# Patient Record
Sex: MALE | Race: WHITE | NOT HISPANIC OR LATINO | Employment: FULL TIME | URBAN - METROPOLITAN AREA
[De-identification: names, ages, dates, MRNs, and addresses within clinical notes are randomized per-mention and may not be internally consistent; named-entity substitution may affect disease eponyms.]

---

## 2023-01-23 ENCOUNTER — TELEPHONE (OUTPATIENT)
Dept: OTOLARYNGOLOGY | Facility: CLINIC | Age: 68
End: 2023-01-23
Payer: MEDICARE

## 2023-01-23 NOTE — TELEPHONE ENCOUNTER
----- Message from Jennifer Massey sent at 1/23/2023  9:13 AM CST -----  Regarding: appt / referral sent: Dr. Ryne Rodriguez  Contact: PT @ [phone]  Caller Claudia with Dr. Ryne Rodriguez's office at [facility] is calling to f/u to see if referral was received from their office for the pt. Pt's dx: hoarseness; referral was sent on January 9th and January 16th. Caller also refaxed the referral this morning. Please call pt to schedule. Thanks.

## 2023-02-02 ENCOUNTER — OFFICE VISIT (OUTPATIENT)
Dept: OTOLARYNGOLOGY | Facility: CLINIC | Age: 68
End: 2023-02-02
Payer: MEDICARE

## 2023-02-02 VITALS — HEART RATE: 67 BPM | DIASTOLIC BLOOD PRESSURE: 84 MMHG | SYSTOLIC BLOOD PRESSURE: 150 MMHG | WEIGHT: 175 LBS

## 2023-02-02 DIAGNOSIS — R49.0 DYSPHONIA: Primary | ICD-10-CM

## 2023-02-02 DIAGNOSIS — J38.7 LEUKOPLAKIA OF LARYNX: ICD-10-CM

## 2023-02-02 DIAGNOSIS — J04.0 LARYNGITIS: ICD-10-CM

## 2023-02-02 PROCEDURE — 31579 LARYNGOSCOPY TELESCOPIC: CPT | Mod: PBBFAC | Performed by: OTOLARYNGOLOGY

## 2023-02-02 PROCEDURE — 99999 PR PBB SHADOW E&M-EST. PATIENT-LVL III: ICD-10-PCS | Mod: PBBFAC,,, | Performed by: OTOLARYNGOLOGY

## 2023-02-02 PROCEDURE — 87076 CULTURE ANAEROBE IDENT EACH: CPT | Performed by: OTOLARYNGOLOGY

## 2023-02-02 PROCEDURE — 87075 CULTR BACTERIA EXCEPT BLOOD: CPT | Performed by: OTOLARYNGOLOGY

## 2023-02-02 PROCEDURE — 31579 LARYNGOSCOPY TELESCOPIC: CPT | Mod: S$PBB,,, | Performed by: OTOLARYNGOLOGY

## 2023-02-02 PROCEDURE — 99213 OFFICE O/P EST LOW 20 MIN: CPT | Mod: PBBFAC | Performed by: OTOLARYNGOLOGY

## 2023-02-02 PROCEDURE — 87186 SC STD MICRODIL/AGAR DIL: CPT | Performed by: OTOLARYNGOLOGY

## 2023-02-02 PROCEDURE — 31579 PR LARYNGOSCOPY, FLEX/RIGID TELESCOPIC, W/STROBOSCOPY: ICD-10-PCS | Mod: S$PBB,,, | Performed by: OTOLARYNGOLOGY

## 2023-02-02 PROCEDURE — 87070 CULTURE OTHR SPECIMN AEROBIC: CPT | Performed by: OTOLARYNGOLOGY

## 2023-02-02 PROCEDURE — 99999 PR PBB SHADOW E&M-EST. PATIENT-LVL III: CPT | Mod: PBBFAC,,, | Performed by: OTOLARYNGOLOGY

## 2023-02-02 PROCEDURE — 99204 OFFICE O/P NEW MOD 45 MIN: CPT | Mod: 25,S$PBB,, | Performed by: OTOLARYNGOLOGY

## 2023-02-02 PROCEDURE — 99204 PR OFFICE/OUTPT VISIT, NEW, LEVL IV, 45-59 MIN: ICD-10-PCS | Mod: 25,S$PBB,, | Performed by: OTOLARYNGOLOGY

## 2023-02-02 PROCEDURE — 87102 FUNGUS ISOLATION CULTURE: CPT | Performed by: OTOLARYNGOLOGY

## 2023-02-02 PROCEDURE — 87077 CULTURE AEROBIC IDENTIFY: CPT | Performed by: OTOLARYNGOLOGY

## 2023-02-02 RX ORDER — OMEPRAZOLE 20 MG/1
TABLET, DELAYED RELEASE ORAL
COMMUNITY

## 2023-02-02 RX ORDER — LISINOPRIL 40 MG/1
20 TABLET ORAL
COMMUNITY
Start: 2023-01-25

## 2023-02-02 RX ORDER — FLUCONAZOLE 100 MG/1
100 TABLET ORAL DAILY
Qty: 22 TABLET | Refills: 0 | Status: SHIPPED | OUTPATIENT
Start: 2023-02-02 | End: 2023-02-13

## 2023-02-02 NOTE — PROGRESS NOTES
[facility]  Department of Otorhinolaryngology and Communication Sciences    Andrew Blum is a 67 y.o. male who [facility] for consultation at the kind request of Dr. Ryne Rodriguez for further management of hoarseness.     He complains of hoarseness. Onset was sudden. Duration is about a year and a half, following an endoscopy. Time course is constant. Symptoms are worsening. Exacerbating factors include voice use. He denies any alleviating factors. Associated symptoms include throat clearing.  He has done a lot of singing over the last few months and thought the problem got a little worse after that.  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  He practices at home.   Difficulty reaching the same high notes as before.  Ongoing hoarseness in speaking voice.  Clears throat frequently.  He is also a , and finds he gets more hoarse if he does a lot of talking. Nearly loses his voice. Denies dysphagia or dyspnea. He had a bad cough from October into the end of the year--completed 2 different courses of abx and steroids.    Voice Handicap Index Total Score  2/2/2023   Voice Handicap Index Total Score 2     Reflux Symptoms Index Total Score 2/2/2023   Reflux Symptoms Index Total Score 18       Past Medical History  HTN  Reflux/ Schatzki ring - omeprazole for 4-5 years     Past Surgical History  Knee surgery  Shoulder surgery  Hernia surgery     Family History  His family history is not on file.    Social History  He     Allergies  He has No Known Allergies.    Medications  He has a current medication list which includes the following prescription(s): lisinopril and omeprazole.    Review of Systems   Constitutional: Negative.  Negative for fever.   HENT:  Positive for postnasal drip and voice change. Negative for sore throat.    Eyes: Negative.  Negative for visual disturbance.   Respiratory:  Positive for cough. Negative for wheezing.    Cardiovascular: Negative.  Negative for  chest pain.   Gastrointestinal:  Negative for nausea.   Endocrine: Negative.    Genitourinary: Negative.    Musculoskeletal:  Positive for back pain and neck pain. Negative for arthralgias.   Skin: Negative.  Negative for rash.   Allergic/Immunologic: Negative.    Neurological: Negative.  Negative for tremors.   Hematological:  Bruises/bleeds easily.   Psychiatric/Behavioral: Negative.  The patient is not nervous/anxious.         Objective:     VS reviewed  Physical Exam  Constitutional: comfortable, well dressed  Psychiatric: appropriate affect  Respiratory: comfortably breathing, symmetric chest rise, no stridor  Voice: faint variable roughness  Cardiovascular: upper extremities non-edematous  Lymphatic: no cervical lymphadenopathy  Neurologic: alert and oriented to time, place, person, and situation; cranial nerves 3-12 grossly intact  Head: normocephalic  Eyes: conjunctivae and sclerae clear  Ears: normal pinnae, normal external auditory canals, tympanic membranes intact  Nose: mucosa pink and noncongested, no masses, no mucopurulence, no polyps  Oral cavity / oropharynx: no mucosal lesions  Neck: soft, full range of motion, laryngotracheal complex palpable with appropriate landmarks, larynx elevates on swallowing  Indirect laryngoscopy: limited due to gag    Procedure  Rigid Laryngeal Videostroboscopy (23993): Laryngeal videostroboscopy is indicated to assess the laryngeal vibratory biomechanics and vocal fold oscillation, which cannot be assessed with a plain light examination. This was carried out with a 70 degree endoscope. After verbal consent was obtained, the patient was positioned and the tongue was gently secured with a gauze sponge. The endoscope was passed transorally and positioned to image the larynx and hypopharynx in detail. The following features were examined: laryngeal and hypopharyngeal masses; vocal fold range and symmetry of motion; laryngeal mucosal edema, erythema, inflammation, and  hydration; salivary pooling; and gross laryngeal sensation. During phonation, the vocal folds were assessed for glottal closure; mucosal wave; vocal fold lesions; vibratory periodicity, amplitude, and phase symmetry; and vertical height match. The equipment was removed. The patient tolerated the procedure well without complication. All findings were normal except:  - leukoplakia along free edge and superior aspect of true vocal folds  - complete closure, pliability intact    Magnified Laryngeal Endoscopy with collection of Laryngeal Culture: Laryngeal culture is indicated to direct antimicrobial therapy. The oropharynx was topically anesthetized with a 1 second spray of Cetacaine.  The 70 degree rigid endoscope was advanced into the oropharynx.  The endolarynx was topically anesthetized with progressive aliquots of 4% lidocaine, with an Jayant cannula via the laryngeal gargle technique. After an adequate degree of anesthesia was obtained, a culture swab was passed transorally to collect a specimen from the surface of the bilateral true vocal folds. The specimen was sent for  aerobic, anaerobic, and fungal culture . The patient tolerated the procedure well without complication.    Assessment:     Andrew Blum is a 67 y.o. male with chronic dysphonia.  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  A culture was obtained at today's visit.       Plan:        I had a discussion with the patient and his wife  regarding his condition and the further workup and management options.      I recommended empiric treatment with Diflucan and sent in a prescription to his pharmacy.    I will follow-up on the culture results and will adjust antimicrobial therapy as appropriate.    He will follow up with me in 2-3 week(s).  Depending on his progress, micro laryngeal surgical evaluation with biopsy and possible laser treatment could be considered.    All questions were answered, and the  patient is in agreement with the above.     Augusto Ruiz M.D.  [facility]  Department of Otorhinolaryngology and Communication Sciences

## 2023-02-05 LAB — BACTERIA SPEC AEROBE CULT: ABNORMAL

## 2023-02-08 LAB — BACTERIA SPEC ANAEROBE CULT: ABNORMAL

## 2023-02-13 ENCOUNTER — TELEPHONE (OUTPATIENT)
Dept: OTOLARYNGOLOGY | Facility: CLINIC | Age: 68
End: 2023-02-13
Payer: MEDICARE

## 2023-02-13 DIAGNOSIS — J04.0 LARYNGITIS: Primary | ICD-10-CM

## 2023-02-13 RX ORDER — AMOXICILLIN AND CLAVULANATE POTASSIUM 875; 125 MG/1; MG/1
1 TABLET, FILM COATED ORAL 2 TIMES DAILY
Qty: 20 TABLET | Refills: 0 | Status: SHIPPED | OUTPATIENT
Start: 2023-02-13 | End: 2023-02-27 | Stop reason: SDUPTHER

## 2023-02-13 NOTE — TELEPHONE ENCOUNTER
----- Message from Augusto Ruiz MD sent at 2/13/2023  8:30 AM CST -----  Please have him stop the Diflucan I Rx'd at his last visit and instead start the Augmentin Rx I just sent into his pharmacy. Thanks.

## 2023-02-27 ENCOUNTER — OFFICE VISIT (OUTPATIENT)
Dept: OTOLARYNGOLOGY | Facility: CLINIC | Age: 68
End: 2023-02-27
Payer: MEDICARE

## 2023-02-27 VITALS — DIASTOLIC BLOOD PRESSURE: 90 MMHG | HEART RATE: 80 BPM | SYSTOLIC BLOOD PRESSURE: 151 MMHG

## 2023-02-27 DIAGNOSIS — J38.7 LEUKOPLAKIA OF LARYNX: ICD-10-CM

## 2023-02-27 DIAGNOSIS — J04.0 LARYNGITIS: ICD-10-CM

## 2023-02-27 DIAGNOSIS — R49.0 DYSPHONIA: Primary | ICD-10-CM

## 2023-02-27 PROCEDURE — 99999 PR PBB SHADOW E&M-EST. PATIENT-LVL III: CPT | Mod: PBBFAC,,, | Performed by: OTOLARYNGOLOGY

## 2023-02-27 PROCEDURE — 31579 LARYNGOSCOPY TELESCOPIC: CPT | Mod: S$PBB,,, | Performed by: OTOLARYNGOLOGY

## 2023-02-27 PROCEDURE — 99213 OFFICE O/P EST LOW 20 MIN: CPT | Mod: PBBFAC,25 | Performed by: OTOLARYNGOLOGY

## 2023-02-27 PROCEDURE — 99213 OFFICE O/P EST LOW 20 MIN: CPT | Mod: 25,S$PBB,, | Performed by: OTOLARYNGOLOGY

## 2023-02-27 PROCEDURE — 99999 PR PBB SHADOW E&M-EST. PATIENT-LVL III: ICD-10-PCS | Mod: PBBFAC,,, | Performed by: OTOLARYNGOLOGY

## 2023-02-27 PROCEDURE — 99213 PR OFFICE/OUTPT VISIT, EST, LEVL III, 20-29 MIN: ICD-10-PCS | Mod: 25,S$PBB,, | Performed by: OTOLARYNGOLOGY

## 2023-02-27 PROCEDURE — 31579 PR LARYNGOSCOPY, FLEX/RIGID TELESCOPIC, W/STROBOSCOPY: ICD-10-PCS | Mod: S$PBB,,, | Performed by: OTOLARYNGOLOGY

## 2023-02-27 PROCEDURE — 31579 LARYNGOSCOPY TELESCOPIC: CPT | Mod: PBBFAC | Performed by: OTOLARYNGOLOGY

## 2023-02-27 RX ORDER — AMOXICILLIN AND CLAVULANATE POTASSIUM 875; 125 MG/1; MG/1
1 TABLET, FILM COATED ORAL 2 TIMES DAILY
Qty: 42 TABLET | Refills: 0 | Status: SHIPPED | OUTPATIENT
Start: 2023-02-27 | End: 2023-04-03 | Stop reason: SDUPTHER

## 2023-02-27 NOTE — PROGRESS NOTES
[facility]  Department of Otorhinolaryngology and Communication Sciences    Subjective:      Andrew Blum is a 67 y.o. male who presents for follow-up regarding leukoplakia/laryngitis.    Fungus - negative (pending)  Aerobic/anaerobic cultures -   SERRATIA LIQUEFACIENS   Moderate   No other significant isolate   Abnormal   PREVOTELLA (B.) MELANINOGENICA   Few     He completed 3-4 days of dilfucan before I switched his therapy to augmentin based on culture results. His voice is much better. It no longer is getting hoarse with use. He still has a little difficulty singing.    The patient's medications, allergies, past medical, surgical, social and family histories were reviewed and updated as appropriate.    A detailed review of systems was obtained with pertinent positives as per the above HPI, and otherwise negative.      Objective:     BP (!) 151/90 (Patient Position: Standing)   Pulse 80    Constitutional: comfortable, well dressed  Psychiatric: appropriate affect  Respiratory: comfortably breathing, symmetric chest rise, no stridor  Voice:  faint variable roughness ; marked interval improvements, particularly in uppermost register  Head: normocephalic  Eyes: conjunctivae and sclerae clear  Indirect laryngoscopy is limited due to gag    Procedure  Rigid Laryngeal Videostroboscopy (64383): Laryngeal videostroboscopy is indicated to assess the laryngeal vibratory biomechanics and vocal fold oscillation, which cannot be assessed with a plain light examination. This was carried out with a 70 degree endoscope. After verbal consent was obtained, the patient was positioned and the tongue was gently secured with a gauze sponge. The endoscope was passed transorally and positioned to image the larynx and hypopharynx in detail. The following features were examined: laryngeal and hypopharyngeal masses; vocal fold range and symmetry of motion; laryngeal mucosal edema, erythema, inflammation, and hydration;  salivary pooling; and gross laryngeal sensation. During phonation, the vocal folds were assessed for glottal closure; mucosal wave; vocal fold lesions; vibratory periodicity, amplitude, and phase symmetry; and vertical height match. The equipment was removed. The patient tolerated the procedure well without complication. All findings were normal except:  - leukoplakia along free edge and superior aspect of true vocal folds; interval decrease in burden since last assessment  - interval recovery of pliability, particularly in uppermost register  - complete closure, pliability intact      Data Reviewed  See HPI      Assessment:     Andrew Blum is a 67 y.o. male with chronic laryngitis, polymicrobial, improving with antibiotic therap[y.     Plan:     Reassurance was provided. I recommended continued antibiotic therapy with 3 weeks more of augmentin.    He will follow up with me in about 3 weeks.    All questions were answered, and the patient is in agreement with the plan.    Augusto Ruiz M.D.  [facility]  Department of Otorhinolaryngology and Communication Sciences

## 2023-03-08 LAB — FUNGUS SPEC CULT: NORMAL

## 2023-04-03 ENCOUNTER — OFFICE VISIT (OUTPATIENT)
Dept: OTOLARYNGOLOGY | Facility: CLINIC | Age: 68
End: 2023-04-03
Payer: MEDICARE

## 2023-04-03 DIAGNOSIS — J04.0 LARYNGITIS: ICD-10-CM

## 2023-04-03 DIAGNOSIS — J38.7 LEUKOPLAKIA OF LARYNX: ICD-10-CM

## 2023-04-03 DIAGNOSIS — R49.0 DYSPHONIA: Primary | ICD-10-CM

## 2023-04-03 PROCEDURE — 99999 PR PBB SHADOW E&M-EST. PATIENT-LVL III: CPT | Mod: PBBFAC,,, | Performed by: OTOLARYNGOLOGY

## 2023-04-03 PROCEDURE — 99213 OFFICE O/P EST LOW 20 MIN: CPT | Mod: 25,S$PBB,, | Performed by: OTOLARYNGOLOGY

## 2023-04-03 PROCEDURE — 99999 PR PBB SHADOW E&M-EST. PATIENT-LVL III: ICD-10-PCS | Mod: PBBFAC,,, | Performed by: OTOLARYNGOLOGY

## 2023-04-03 PROCEDURE — 99213 PR OFFICE/OUTPT VISIT, EST, LEVL III, 20-29 MIN: ICD-10-PCS | Mod: 25,S$PBB,, | Performed by: OTOLARYNGOLOGY

## 2023-04-03 PROCEDURE — 31579 LARYNGOSCOPY TELESCOPIC: CPT | Mod: S$PBB,,, | Performed by: OTOLARYNGOLOGY

## 2023-04-03 PROCEDURE — 99213 OFFICE O/P EST LOW 20 MIN: CPT | Mod: PBBFAC | Performed by: OTOLARYNGOLOGY

## 2023-04-03 PROCEDURE — 31579 PR LARYNGOSCOPY, FLEX/RIGID TELESCOPIC, W/STROBOSCOPY: ICD-10-PCS | Mod: S$PBB,,, | Performed by: OTOLARYNGOLOGY

## 2023-04-03 PROCEDURE — 31579 LARYNGOSCOPY TELESCOPIC: CPT | Mod: PBBFAC | Performed by: OTOLARYNGOLOGY

## 2023-04-03 RX ORDER — AMOXICILLIN AND CLAVULANATE POTASSIUM 875; 125 MG/1; MG/1
1 TABLET, FILM COATED ORAL 2 TIMES DAILY
Qty: 28 TABLET | Refills: 0 | Status: SHIPPED | OUTPATIENT
Start: 2023-04-03 | End: 2023-04-17

## 2023-04-03 NOTE — PROGRESS NOTES
[facility]  Department of Otorhinolaryngology and Communication Sciences    Subjective:      Andrew Blum is a 67 y.o. male who presents for follow-up regarding leukoplakia/laryngitis.    Cultures 2/2/2023  Fungus - negative  Aerobic/anaerobic cultures -   SERRATIA LIQUEFACIENS   Moderate   No other significant isolate   Abnormal   PREVOTELLA (B.) MELANINOGENICA   Few     He is done well since his last visit.  He completed another 3 weeks of Augmentin.  His voice continues to improve.  He senses that his voice is almost back to baseline.  Nevertheless, within the last week or so, he has been struggling a bit with allergy symptoms including cough and throat clearing.  He has been taking an ACE inhibitor for a long time.    The patient's medications, allergies, past medical, surgical, social and family histories were reviewed and updated as appropriate.    A detailed review of systems was obtained with pertinent positives as per the above HPI, and otherwise negative.      Objective:     VS reviewed   Constitutional: comfortable, well dressed  Psychiatric: appropriate affect  Respiratory: comfortably breathing, symmetric chest rise, no stridor  Voice:  faint variable roughness ; interval improvements  Head: normocephalic  Eyes: conjunctivae and sclerae clear  Indirect laryngoscopy is limited due to gag    Procedure  Rigid Laryngeal Videostroboscopy (56802): Laryngeal videostroboscopy is indicated to assess the laryngeal vibratory biomechanics and vocal fold oscillation, which cannot be assessed with a plain light examination. This was carried out with a 70 degree endoscope. After verbal consent was obtained, the patient was positioned and the tongue was gently secured with a gauze sponge. The endoscope was passed transorally and positioned to image the larynx and hypopharynx in detail. The following features were examined: laryngeal and hypopharyngeal masses; vocal fold range and symmetry of motion;  laryngeal mucosal edema, erythema, inflammation, and hydration; salivary pooling; and gross laryngeal sensation. During phonation, the vocal folds were assessed for glottal closure; mucosal wave; vocal fold lesions; vibratory periodicity, amplitude, and phase symmetry; and vertical height match. The equipment was removed. The patient tolerated the procedure well without complication. All findings were normal except:  - leukoplakia along free edge and superior aspect of true vocal folds; continued interval decrease in burden since last assessment  - interval further recovery of pliability, particularly in uppermost register  - complete closure, pliability intact      Data Reviewed  See HPI      Assessment:     Andrew Blum is a 67 y.o. male with chronic laryngitis, polymicrobial, improving with antibiotic therapy.     Plan:     Reassurance was provided. I recommended continued antibiotic therapy with 2 weeks more of augmentin.    Should the leukoplakia persist, we could consider operative intervention to include biopsy and possible laser treatment.    He will follow up with me in about 3 weeks.    All questions were answered, and the patient is in agreement with the plan.    Augusto Ruiz M.D.  [facility]  Department of Otorhinolaryngology and Communication Sciences

## 2023-05-10 ENCOUNTER — OFFICE VISIT (OUTPATIENT)
Dept: OTOLARYNGOLOGY | Facility: CLINIC | Age: 68
End: 2023-05-10
Payer: MEDICARE

## 2023-05-10 VITALS — SYSTOLIC BLOOD PRESSURE: 139 MMHG | HEART RATE: 71 BPM | DIASTOLIC BLOOD PRESSURE: 86 MMHG

## 2023-05-10 DIAGNOSIS — J04.0 LARYNGITIS: ICD-10-CM

## 2023-05-10 DIAGNOSIS — J38.7 LEUKOPLAKIA OF LARYNX: ICD-10-CM

## 2023-05-10 DIAGNOSIS — D38.0 NEOPLASM OF UNCERTAIN BEHAVIOR OF LARYNX: Primary | ICD-10-CM

## 2023-05-10 DIAGNOSIS — R49.0 DYSPHONIA: ICD-10-CM

## 2023-05-10 PROCEDURE — 99999 PR PBB SHADOW E&M-EST. PATIENT-LVL IV: ICD-10-PCS | Mod: PBBFAC,,, | Performed by: OTOLARYNGOLOGY

## 2023-05-10 PROCEDURE — 99214 OFFICE O/P EST MOD 30 MIN: CPT | Mod: PBBFAC | Performed by: OTOLARYNGOLOGY

## 2023-05-10 PROCEDURE — 31579 LARYNGOSCOPY TELESCOPIC: CPT | Mod: PBBFAC | Performed by: OTOLARYNGOLOGY

## 2023-05-10 PROCEDURE — 99999 PR PBB SHADOW E&M-EST. PATIENT-LVL IV: CPT | Mod: PBBFAC,,, | Performed by: OTOLARYNGOLOGY

## 2023-05-10 PROCEDURE — 99214 OFFICE O/P EST MOD 30 MIN: CPT | Mod: 25,S$PBB,, | Performed by: OTOLARYNGOLOGY

## 2023-05-10 PROCEDURE — 99214 PR OFFICE/OUTPT VISIT, EST, LEVL IV, 30-39 MIN: ICD-10-PCS | Mod: 25,S$PBB,, | Performed by: OTOLARYNGOLOGY

## 2023-05-10 PROCEDURE — 31579 LARYNGOSCOPY TELESCOPIC: CPT | Mod: S$PBB,,, | Performed by: OTOLARYNGOLOGY

## 2023-05-10 PROCEDURE — 31579 PR LARYNGOSCOPY, FLEX/RIGID TELESCOPIC, W/STROBOSCOPY: ICD-10-PCS | Mod: S$PBB,,, | Performed by: OTOLARYNGOLOGY

## 2023-05-10 NOTE — H&P (VIEW-ONLY)
[facility]  Department of Otorhinolaryngology and Communication Sciences    Subjective:      Andrew Blum is a 67 y.o. male who presents for follow-up regarding leukoplakia/laryngitis.    Cultures 2/2/2023  Fungus - negative  Aerobic/anaerobic cultures -   SERRATIA LIQUEFACIENS   Moderate   No other significant isolate   Abnormal   PREVOTELLA (B.) MELANINOGENICA   Few     He is done well since his last visit.  He completed another 2 weeks of Augmentin.  His voice continues to improve--especially within the last 2 weeks.    The patient's medications, allergies, past medical, surgical, social and family histories were reviewed and updated as appropriate.    A detailed review of systems was obtained with pertinent positives as per the above HPI, and otherwise negative.      Objective:     VS reviewed   Constitutional: comfortable, well dressed  Psychiatric: appropriate affect  Respiratory: comfortably breathing, symmetric chest rise, no stridor  Voice:  faint variable roughness ; interval improvements  Head: normocephalic  Eyes: conjunctivae and sclerae clear  Indirect laryngoscopy is limited due to gag    Procedure  Rigid Laryngeal Videostroboscopy (84538): Laryngeal videostroboscopy is indicated to assess the laryngeal vibratory biomechanics and vocal fold oscillation, which cannot be assessed with a plain light examination. This was carried out with a 70 degree endoscope. After verbal consent was obtained, the patient was positioned and the tongue was gently secured with a gauze sponge. The endoscope was passed transorally and positioned to image the larynx and hypopharynx in detail. The following features were examined: laryngeal and hypopharyngeal masses; vocal fold range and symmetry of motion; laryngeal mucosal edema, erythema, inflammation, and hydration; salivary pooling; and gross laryngeal sensation. During phonation, the vocal folds were assessed for glottal closure; mucosal wave; vocal fold  lesions; vibratory periodicity, amplitude, and phase symmetry; and vertical height match. The equipment was removed. The patient tolerated the procedure well without complication. All findings were normal except:  - persistent leukoplakia along free edge and superior aspect of true vocal folds; stable since last assessment  - complete closure, pliability intact      Data Reviewed  See HPI      Assessment:     Andrew Blum is a 67 y.o. male with chronic laryngitis, polymicrobial, improved with antibiotic therapy yet with persistent leukoplakia.     Plan:     I recommended that he proceed to the operating room for microlaryngoscopy with micro flap biopsy, possible laser photo ablation--the extent of which is to be dictated by intraoperative findings. I discussed the risks, benefits and alternatives to surgery with the patient, as well as the expected postoperative course. Risks included but were not limited to pain; bleeding; infection; scarring; worsening of voice; recurrence; need for additional and/or more extensive procedures; oral/dental problems (pain, laceration, broken or missing teeth); jaw joint problems (pain, dislocation); and tongue problems (pain, laceration, numbness, weakness, taste disturbance). Any surgery on the larynx also carries with it the risks of airway obstruction necessitating tracheotomy. Also inherent in the procedure are the risks of general anesthesia, including but not limited to cardiovascular complications (heart attack, arrhythmia); pulmonary (respiratory failure); neurologic (stroke); and death.  If the laser was used, it presents risks of vision loss and fire.    I gave the patient the opportunity to ask questions and I answered all of them.  He wishes to proceed.  We will arrange this in the coming weeks, with preoperative testing triage to be completed by the anesthesiology team.    All questions were answered, and the patient is in agreement with the plan.    Augusto NICOLAS  SHARON Ruiz.  [facility]  Department of Otorhinolaryngology and Communication Sciences

## 2023-05-10 NOTE — PROGRESS NOTES
[facility]  Department of Otorhinolaryngology and Communication Sciences    Subjective:      Andrew Blum is a 67 y.o. male who presents for follow-up regarding leukoplakia/laryngitis.    Cultures 2/2/2023  Fungus - negative  Aerobic/anaerobic cultures -   SERRATIA LIQUEFACIENS   Moderate   No other significant isolate   Abnormal   PREVOTELLA (B.) MELANINOGENICA   Few     He is done well since his last visit.  He completed another 2 weeks of Augmentin.  His voice continues to improve--especially within the last 2 weeks.    The patient's medications, allergies, past medical, surgical, social and family histories were reviewed and updated as appropriate.    A detailed review of systems was obtained with pertinent positives as per the above HPI, and otherwise negative.      Objective:     VS reviewed   Constitutional: comfortable, well dressed  Psychiatric: appropriate affect  Respiratory: comfortably breathing, symmetric chest rise, no stridor  Voice:  faint variable roughness ; interval improvements  Head: normocephalic  Eyes: conjunctivae and sclerae clear  Indirect laryngoscopy is limited due to gag    Procedure  Rigid Laryngeal Videostroboscopy (48918): Laryngeal videostroboscopy is indicated to assess the laryngeal vibratory biomechanics and vocal fold oscillation, which cannot be assessed with a plain light examination. This was carried out with a 70 degree endoscope. After verbal consent was obtained, the patient was positioned and the tongue was gently secured with a gauze sponge. The endoscope was passed transorally and positioned to image the larynx and hypopharynx in detail. The following features were examined: laryngeal and hypopharyngeal masses; vocal fold range and symmetry of motion; laryngeal mucosal edema, erythema, inflammation, and hydration; salivary pooling; and gross laryngeal sensation. During phonation, the vocal folds were assessed for glottal closure; mucosal wave; vocal fold  lesions; vibratory periodicity, amplitude, and phase symmetry; and vertical height match. The equipment was removed. The patient tolerated the procedure well without complication. All findings were normal except:  - persistent leukoplakia along free edge and superior aspect of true vocal folds; stable since last assessment  - complete closure, pliability intact      Data Reviewed  See HPI      Assessment:     Andrew Blum is a 67 y.o. male with chronic laryngitis, polymicrobial, improved with antibiotic therapy yet with persistent leukoplakia.     Plan:     I recommended that he proceed to the operating room for microlaryngoscopy with micro flap biopsy, possible laser photo ablation--the extent of which is to be dictated by intraoperative findings. I discussed the risks, benefits and alternatives to surgery with the patient, as well as the expected postoperative course. Risks included but were not limited to pain; bleeding; infection; scarring; worsening of voice; recurrence; need for additional and/or more extensive procedures; oral/dental problems (pain, laceration, broken or missing teeth); jaw joint problems (pain, dislocation); and tongue problems (pain, laceration, numbness, weakness, taste disturbance). Any surgery on the larynx also carries with it the risks of airway obstruction necessitating tracheotomy. Also inherent in the procedure are the risks of general anesthesia, including but not limited to cardiovascular complications (heart attack, arrhythmia); pulmonary (respiratory failure); neurologic (stroke); and death.  If the laser was used, it presents risks of vision loss and fire.    I gave the patient the opportunity to ask questions and I answered all of them.  He wishes to proceed.  We will arrange this in the coming weeks, with preoperative testing triage to be completed by the anesthesiology team.    All questions were answered, and the patient is in agreement with the plan.    Augusto NICOLAS  SHARON Ruiz.  [facility]  Department of Otorhinolaryngology and Communication Sciences

## 2023-05-10 NOTE — PATIENT INSTRUCTIONS
MICROLARYNGOSCOPY    Description  Laryngoscopy is a procedure in which the surgeon closely examines the larynx (voice box). The key instrument for laryngoscopy is the laryngoscope, which is a hollow metal tube inserted into the mouth. Microlaryngoscopy entails--at minimum--a magnified examination of the larynx using a microscope and/or telescopes. This is performed in the operating room. This allows the surgeon to achieve a diagnosis and also to perform precise treatment for problems on the vocal folds (vocal cords) or other parts of the larynx. Additional interventions may be performed in conjunction with microlaryngoscopy. Common interventions include but are not limited to  Biopsy  Removal of abnormal tissue (polyps, cysts, nodules, leukoplakia)  Resection of cancer  Laser treatment of abnormal tissue (papilloma, leukoplakia)  Vocal fold injection augmentation  Injection of medication (steroid, Avastin)    Instructions: before the procedure  NPO after midnight: This is a medical expression for nothing to eat or drink after midnight. It is important to refrain from eating or drinking anything after midnight the night before your surgery.   Please refrain from taking any anti-platelet medications such as aspirin; ibuprofen (Advil, Motrin); Aleve; or Plavix (clopidogrel) for 7 days prior to the procedure.  If you usually take Coumadin (warfarin), you may need to stop using this a few days prior to the injection.   If you are any type of blood thinning (anti-platelet or anti-coagulant) medication and it is not clear what you should do, please clarify this with your surgeon ahead of time. In some cases we rely on other physicians such as cardiologists or hematologists to help with these decisions.  Diabetes precautions: If you are usually take oral diabetes medications (such as metformin), refrain from taking your morning dose the day of the procedure and use sliding-scale insulin for blood sugar  control.  On the morning of your procedure, it is OK to take your other regular morning medications with a small sip of water. It is particularly important to take any medications that treat heart problems (such as blood pressure, heart rate, heart rhythm) and lung problems  (asthma, COPD). Otherwise, please remember: nothing to eat or drink after midnight.      What to expect during the procedure  Microlaryngoscopy is performed in the operating room while you are asleep under general anesthesia. In most instances, you can expect to be under general anesthesia for approximately 1 to 1 ½ hours.  Nevertheless, the duration of the procedure varies depending on the indication for surgery, intraoperative findings, and other patient-specific/anatomic issues. Our primary concerns are your safety and comfort. Our secondary goal is to provide you with the best possible surgical treatment for your problem. Your surgery will take as long as necessary to accomplish these goals.    What to expect afterwards  The laryngoscope is inserted through the mouth and presses on the tongue. The most common postoperative issues patients encounter are related to the laryngoscope being positioned in the mouth. The extent of these issues is related to patient-specific anatomic issues, the indications for surgery, and the duration of the procedure.    Pain. We will do everything we can to make sure you are as comfortable as possible. Most patients experience very little discomfort after this surgery. Nevertheless, you should expect some soreness in the mouth and throat. Because the ear and the throat share the same sensory nerve, you may also experience some discomfort in the ear. The discomfort is usually worst for the first 48-72 hours and usually resolves within a week. You will be prescribed pain medication, but most patients are sufficiently comfortable with plain Tylenol as needed.    Laceration. Some patients may notice a small cut in  the tongue or in another part of the mouth or throat. This may result in a minor about of blood-tinged saliva for the first 24 hours. This usually heals on its own over the course of a week.    Other tongue problems. As the scope presses down on the tongue, the taste buds get compressed. In addition, sometimes the nerves to the tongue also get compressed. As a result, some patients notice a disturbance in taste, numbness of the tongue, or (even more rarely) weakness of the tongue after surgery. Although sometimes it may take several weeks to months for the problem to completely go away, the tongue problems are temporary in almost every instance.    Tooth problems. Reinforced mouth guards are placed on the teeth to protect them during the surgery and we give a great deal of care and attention to minimizing any pressure on the teeth. However, a chipped or cracked tooth, loss of a tooth, and/or other tooth irregularities are rare but well-recognized complications of laryngoscopy.    Jaw problems. You may experience some pain in the jaw joints (in front of the ears). Even less frequent would be dislocation of the joint. This usually occurs in patients who already have jaw problems.    Instructions: after the procedure  Please take the prescribed pain medication as directed. If you are still having discomfort after the prescription runs out, or if you would rather not take a prescription narcotic pain medicine, you may instead take plain acetaminophen (Tylenol) as directed on the packaging.  Voice rest. In many instances, we will recommend COMPLETE VOICE REST until your follow-up visit with your surgeon. This means COMPLETE SILENCE - NO TALKING, NO COUGHING, NO WHISPERING. Please see additional information on how to manage complete voice rest. Even if voice rest is not prescribed, for the first week, you should avoid speaking over heavy background noise or in a very loud voice.   Please call our office at [phone]  if  You have a temperature above 101°F  You develop Increasing pain not relieved by medications  You have any other questions or concerns  Please go immediately to the nearest emergency room if you are experiencing  Shortness of breath  Difficulty breathing  Severe bleeding        VOICE REST FOLLOWING SURGERY     After having laryngeal (voice box) surgery, your voice needs complete rest in order to heal. At your follow-up appointment, which will be 4-6 days later, we will look at your larynx and see how it is healing. Then we will discuss a modified voice rest plan to gradually increase your voice use. This schedule is a basic guideline; specific time periods for complete and modified voice rest will be tailored to you.     OVERALL GUIDELINES FOR VOICE REST   Avoid coughing or throat clearing. If you feel the need to clear your throat, take a sip of water and swallow hard.   Avoid strenuous exercise or activity. Any noise or straining activity to your vocal cords during this time can be damaging and affect how your vocal folds heal.   Remain hydrated. Drink 8-10 glasses of water per day. Avoid caffeine, alcohol, and mentholated cough drops.   Breathe steam several times per day, either from your shower, sink, or a personal humidifier.   If you have reflux, follow diet precautions and take medicine as prescribed.   Avoid first- and second-hand smoke.     IMMEDIATELY AFTER SURGERY:   Week 1 following surgery  Complete voice rest  AFTER FOLLOW-UP APPOINTMENT:   Week 2 following surgery  Modified voice rest      Avoid talking, whispering, throat clearing, coughing, singing, humming, laughing, whistling, or playing musical instruments.   Use pen and paper to write message, or try an jessie on your smart phone/tablet. -- Biotect[employer]one apps: [employer], [employer] Text to Speech.  Android apps: Text to Speech toy.  Arrange for appropriate  support.   Change your outgoing voicemail message to redirect callers to email or  text. Days 8-9: 5-10 minutes of voicing per hour, or voice use for business of life only   Days 10-11: 10-15 minutes of voicing per hour, or voice use for short sentences   Days 12-13: 15-20 minutes of voicing per hour, or voice use for short paragraphs   Days 14-15: 20-30 minutes of voicing per hour   Days 16-17: Ramp up voice use to 80% of normal use. Be sure to take 10-minute voice naps each hour.        Keep in mind that this is a personalized progression. If you have any trouble, back up and do not progress until you are ready. Do not keep talking if your voice wears out or feels tired.

## 2023-05-11 RX ORDER — DEXAMETHASONE SODIUM PHOSPHATE 4 MG/ML
8 INJECTION, SOLUTION INTRA-ARTICULAR; INTRALESIONAL; INTRAMUSCULAR; INTRAVENOUS; SOFT TISSUE
Status: CANCELLED | OUTPATIENT
Start: 2023-05-11

## 2023-05-11 RX ORDER — LIDOCAINE HYDROCHLORIDE 10 MG/ML
1 INJECTION, SOLUTION EPIDURAL; INFILTRATION; INTRACAUDAL; PERINEURAL ONCE
Status: CANCELLED | OUTPATIENT
Start: 2023-05-11 | End: 2023-05-11

## 2023-05-12 ENCOUNTER — PATIENT MESSAGE (OUTPATIENT)
Dept: OTOLARYNGOLOGY | Facility: CLINIC | Age: 68
End: 2023-05-12
Payer: MEDICARE

## 2023-06-06 ENCOUNTER — ANESTHESIA EVENT (OUTPATIENT)
Dept: SURGERY | Facility: HOSPITAL | Age: 68
End: 2023-06-06
Payer: MEDICARE

## 2023-06-06 NOTE — ANESTHESIA PREPROCEDURE EVALUATION
06/06/2023  Andrew Blum is a 67 y.o., male.      Pre-op Assessment    I have reviewed the Patient Summary Reports.       I have reviewed the Medications.     Review of Systems  Anesthesia Hx:  No problems with previous Anesthesia  Denies Family Hx of Anesthesia complications.   Denies Personal Hx of Anesthesia complications.   EENT/Dental:   Vocal cord lesion   Cardiovascular:   Exercise tolerance: good Hypertension, well controlled NYHA Classification II    Hepatic/GI:   Hiatal Hernia, GERD, well controlled    Musculoskeletal:   Arthritis   Spine Disorders: lumbar Degenerative disease and Chronic Pain      Swelling of right knee joint 02/04/2022     Right knee pain 02/04/2022     Low back pain 01/14/2022     Posture abnormality 01/14/2022     Decreased range of motion of lumbar spine 01/14/2022     Esophageal stricture 02/08/2021     Chronic superficial gastritis without bleeding 01/18/2021     Diverticulosis of colon without hemorrhage 01/18/2021     Duodenitis 01/18/2021     Hiatal hernia 01/18/2021     Personal history of colonic polyps 01/08/2021     The visualized lower lungs are grossly clear aside from a calcified   granuloma in the lingula.   The visualized heart size is within normal limits.   There is no pericardial fluid.   A few calcified left hilar lymph nodes are noted, most compatible with   old granulomatous disease.   No significant lower mediastinal mass is detected.     The Smartscore calculation is 213.   The left main coronary artery score is 0.   The LAD score is 124.   The circumflex score is 0.   The right coronary artery score is 89.   The PDA score is 0.    Surgical History    Surgical History  Surgery Date Site/Laterality Comments   SHOULDER SURGERY          HERNIA REPAIR            Physical Exam  General: Well nourished, Cooperative, Alert and Oriented    Airway:  Mallampati:  II   Mouth Opening: Normal  TM Distance: Normal  Tongue: Normal  Neck ROM: Normal ROM    Dental:  Intact        Anesthesia Plan  Type of Anesthesia, risks & benefits discussed:    Anesthesia Type: Gen ETT  Intra-op Monitoring Plan: Standard ASA Monitors  Post Op Pain Control Plan: multimodal analgesia and IV/PO Opioids PRN  Induction:  IV  Informed Consent: Informed consent signed with the Patient and all parties understand the risks and agree with anesthesia plan.  All questions answered.   ASA Score: 2  Day of Surgery Review of History & Physical: H&P Update referred to the surgeon/provider.I have interviewed and examined the patient. I have reviewed the patient's H&P dated: There are no significant changes. H&P completed by Anesthesiologist.  Anesthesia Plan Notes: RSI    Ready For Surgery From Anesthesia Perspective.     .

## 2023-06-08 ENCOUNTER — HOSPITAL ENCOUNTER (OUTPATIENT)
Facility: HOSPITAL | Age: 68
Discharge: HOME | End: 2023-06-08
Attending: OTOLARYNGOLOGY | Admitting: OTOLARYNGOLOGY
Payer: MEDICARE

## 2023-06-08 ENCOUNTER — ANESTHESIA (OUTPATIENT)
Dept: SURGERY | Facility: HOSPITAL | Age: 68
End: 2023-06-08
Payer: MEDICARE

## 2023-06-08 VITALS
OXYGEN SATURATION: 94 % | HEART RATE: 82 BPM | BODY MASS INDEX: 25.11 KG/M2 | TEMPERATURE: 98 F | DIASTOLIC BLOOD PRESSURE: 81 MMHG | RESPIRATION RATE: 16 BRPM | HEIGHT: 70 IN | SYSTOLIC BLOOD PRESSURE: 143 MMHG

## 2023-06-08 DIAGNOSIS — D38.0 NEOPLASM OF UNCERTAIN BEHAVIOR OF LARYNX: Primary | ICD-10-CM

## 2023-06-08 PROCEDURE — 25000003 PHARM REV CODE 250: Performed by: NURSE ANESTHETIST, CERTIFIED REGISTERED

## 2023-06-08 PROCEDURE — 71000015 HC POSTOP RECOV 1ST HR: Performed by: OTOLARYNGOLOGY

## 2023-06-08 PROCEDURE — 63600175 PHARM REV CODE 636 W HCPCS: Performed by: OTOLARYNGOLOGY

## 2023-06-08 PROCEDURE — 25000003 PHARM REV CODE 250: Performed by: ANESTHESIOLOGY

## 2023-06-08 PROCEDURE — 94761 N-INVAS EAR/PLS OXIMETRY MLT: CPT

## 2023-06-08 PROCEDURE — 31541 LARYNSCOP W/TUMR EXC + SCOPE: CPT | Mod: ,,, | Performed by: OTOLARYNGOLOGY

## 2023-06-08 PROCEDURE — 37000009 HC ANESTHESIA EA ADD 15 MINS: Performed by: OTOLARYNGOLOGY

## 2023-06-08 PROCEDURE — 31571 LARYNGOSCOP W/VC INJ + SCOPE: CPT | Mod: 51,,, | Performed by: OTOLARYNGOLOGY

## 2023-06-08 PROCEDURE — 36000708 HC OR TIME LEV III 1ST 15 MIN: Performed by: OTOLARYNGOLOGY

## 2023-06-08 PROCEDURE — 36000709 HC OR TIME LEV III EA ADD 15 MIN: Performed by: OTOLARYNGOLOGY

## 2023-06-08 PROCEDURE — 88305 TISSUE EXAM BY PATHOLOGIST: CPT | Mod: 59 | Performed by: PATHOLOGY

## 2023-06-08 PROCEDURE — 71000033 HC RECOVERY, INTIAL HOUR: Performed by: OTOLARYNGOLOGY

## 2023-06-08 PROCEDURE — 27201423 OPTIME MED/SURG SUP & DEVICES STERILE SUPPLY: Performed by: OTOLARYNGOLOGY

## 2023-06-08 PROCEDURE — 88305 TISSUE EXAM BY PATHOLOGIST: CPT | Mod: 26,,, | Performed by: PATHOLOGY

## 2023-06-08 PROCEDURE — D9220A PRA ANESTHESIA: Mod: ,,, | Performed by: NURSE ANESTHETIST, CERTIFIED REGISTERED

## 2023-06-08 PROCEDURE — 99900035 HC TECH TIME PER 15 MIN (STAT)

## 2023-06-08 PROCEDURE — 31541 PR LARYNGOSCOPY,DIRCT,OP SCOP,EXC TUMR: ICD-10-PCS | Mod: ,,, | Performed by: OTOLARYNGOLOGY

## 2023-06-08 PROCEDURE — 37000008 HC ANESTHESIA 1ST 15 MINUTES: Performed by: OTOLARYNGOLOGY

## 2023-06-08 PROCEDURE — D9220A PRA ANESTHESIA: ICD-10-PCS | Mod: ,,, | Performed by: NURSE ANESTHETIST, CERTIFIED REGISTERED

## 2023-06-08 PROCEDURE — 63600175 PHARM REV CODE 636 W HCPCS: Performed by: ANESTHESIOLOGY

## 2023-06-08 PROCEDURE — 25000003 PHARM REV CODE 250: Performed by: OTOLARYNGOLOGY

## 2023-06-08 PROCEDURE — 31571 PR LARYNGOSCOPY,DIRECT,SCOPE,INJ CORDS: ICD-10-PCS | Mod: 51,,, | Performed by: OTOLARYNGOLOGY

## 2023-06-08 PROCEDURE — 63600175 PHARM REV CODE 636 W HCPCS: Performed by: NURSE ANESTHETIST, CERTIFIED REGISTERED

## 2023-06-08 PROCEDURE — 88305 TISSUE EXAM BY PATHOLOGIST: ICD-10-PCS | Mod: 26,,, | Performed by: PATHOLOGY

## 2023-06-08 RX ORDER — HYDROCODONE BITARTRATE AND ACETAMINOPHEN 5; 325 MG/1; MG/1
1 TABLET ORAL ONCE AS NEEDED
Status: ACTIVE | OUTPATIENT
Start: 2023-06-08

## 2023-06-08 RX ORDER — PHENYLEPHRINE HYDROCHLORIDE 10 MG/ML
INJECTION INTRAVENOUS
Status: DISCONTINUED | OUTPATIENT
Start: 2023-06-08 | End: 2023-06-08

## 2023-06-08 RX ORDER — ROCURONIUM BROMIDE 10 MG/ML
INJECTION, SOLUTION INTRAVENOUS
Status: DISCONTINUED | OUTPATIENT
Start: 2023-06-08 | End: 2023-06-08

## 2023-06-08 RX ORDER — HYDROCODONE BITARTRATE AND ACETAMINOPHEN 5; 325 MG/1; MG/1
1 TABLET ORAL EVERY 6 HOURS PRN
Qty: 10 TABLET | Refills: 0 | Status: SHIPPED | OUTPATIENT
Start: 2023-06-08 | End: 2023-07-26

## 2023-06-08 RX ORDER — FAMOTIDINE 10 MG/ML
20 INJECTION INTRAVENOUS
Status: COMPLETED | OUTPATIENT
Start: 2023-06-08 | End: 2023-06-08

## 2023-06-08 RX ORDER — MIDAZOLAM HYDROCHLORIDE 1 MG/ML
INJECTION, SOLUTION INTRAMUSCULAR; INTRAVENOUS
Status: DISCONTINUED | OUTPATIENT
Start: 2023-06-08 | End: 2023-06-08

## 2023-06-08 RX ORDER — METOCLOPRAMIDE HYDROCHLORIDE 5 MG/ML
10 INJECTION INTRAMUSCULAR; INTRAVENOUS
Status: COMPLETED | OUTPATIENT
Start: 2023-06-08 | End: 2023-06-08

## 2023-06-08 RX ORDER — LIDOCAINE HYDROCHLORIDE 10 MG/ML
1 INJECTION, SOLUTION EPIDURAL; INFILTRATION; INTRACAUDAL; PERINEURAL ONCE
Status: DISCONTINUED | OUTPATIENT
Start: 2023-06-08 | End: 2023-06-08 | Stop reason: HOSPADM

## 2023-06-08 RX ORDER — FAMOTIDINE 10 MG/ML
INJECTION INTRAVENOUS
Status: DISCONTINUED | OUTPATIENT
Start: 2023-06-08 | End: 2023-06-08

## 2023-06-08 RX ORDER — ESMOLOL HYDROCHLORIDE 10 MG/ML
INJECTION INTRAVENOUS
Status: DISCONTINUED | OUTPATIENT
Start: 2023-06-08 | End: 2023-06-08

## 2023-06-08 RX ORDER — LIDOCAINE HYDROCHLORIDE 40 MG/ML
INJECTION, SOLUTION RETROBULBAR
Status: DISCONTINUED | OUTPATIENT
Start: 2023-06-08 | End: 2023-06-08 | Stop reason: HOSPADM

## 2023-06-08 RX ORDER — ACETAMINOPHEN 500 MG
1000 TABLET ORAL
Status: COMPLETED | OUTPATIENT
Start: 2023-06-08 | End: 2023-06-08

## 2023-06-08 RX ORDER — KETOROLAC TROMETHAMINE 30 MG/ML
15 INJECTION, SOLUTION INTRAMUSCULAR; INTRAVENOUS EVERY 30 MIN PRN
Status: ACTIVE | OUTPATIENT
Start: 2023-06-08 | End: 2023-06-08

## 2023-06-08 RX ORDER — ONDANSETRON 2 MG/ML
INJECTION INTRAMUSCULAR; INTRAVENOUS
Status: DISCONTINUED | OUTPATIENT
Start: 2023-06-08 | End: 2023-06-08

## 2023-06-08 RX ORDER — PROPOFOL 10 MG/ML
VIAL (ML) INTRAVENOUS
Status: DISCONTINUED | OUTPATIENT
Start: 2023-06-08 | End: 2023-06-08

## 2023-06-08 RX ORDER — SODIUM CITRATE AND CITRIC ACID MONOHYDRATE 334; 500 MG/5ML; MG/5ML
30 SOLUTION ORAL ONCE
Status: COMPLETED | OUTPATIENT
Start: 2023-06-08 | End: 2023-06-08

## 2023-06-08 RX ORDER — EPINEPHRINE 1 MG/ML
INJECTION, SOLUTION, CONCENTRATE INTRAVENOUS
Status: DISCONTINUED | OUTPATIENT
Start: 2023-06-08 | End: 2023-06-08 | Stop reason: HOSPADM

## 2023-06-08 RX ORDER — GABAPENTIN 300 MG/1
300 CAPSULE ORAL
Status: COMPLETED | OUTPATIENT
Start: 2023-06-08 | End: 2023-06-08

## 2023-06-08 RX ORDER — ONDANSETRON 4 MG/1
4 TABLET, ORALLY DISINTEGRATING ORAL ONCE
Qty: 1 TABLET | Refills: 0 | Status: SHIPPED | OUTPATIENT
Start: 2023-06-08 | End: 2023-06-09

## 2023-06-08 RX ORDER — ONDANSETRON 2 MG/ML
4 INJECTION INTRAMUSCULAR; INTRAVENOUS ONCE AS NEEDED
Status: ACTIVE | OUTPATIENT
Start: 2023-06-08 | End: 2034-11-04

## 2023-06-08 RX ORDER — FENTANYL CITRATE 50 UG/ML
INJECTION, SOLUTION INTRAMUSCULAR; INTRAVENOUS
Status: DISCONTINUED | OUTPATIENT
Start: 2023-06-08 | End: 2023-06-08

## 2023-06-08 RX ORDER — DEXAMETHASONE SODIUM PHOSPHATE 4 MG/ML
INJECTION, SOLUTION INTRA-ARTICULAR; INTRALESIONAL; INTRAMUSCULAR; INTRAVENOUS; SOFT TISSUE
Status: DISCONTINUED | OUTPATIENT
Start: 2023-06-08 | End: 2023-06-08

## 2023-06-08 RX ADMIN — FAMOTIDINE 20 MG: 10 INJECTION, SOLUTION INTRAVENOUS at 10:06

## 2023-06-08 RX ADMIN — FAMOTIDINE 20 MG: 10 INJECTION, SOLUTION INTRAVENOUS at 08:06

## 2023-06-08 RX ADMIN — DEXAMETHASONE SODIUM PHOSPHATE 8 MG: 4 INJECTION, SOLUTION INTRAMUSCULAR; INTRAVENOUS at 10:06

## 2023-06-08 RX ADMIN — PHENYLEPHRINE HYDROCHLORIDE 100 MCG: 10 INJECTION INTRAVENOUS at 11:06

## 2023-06-08 RX ADMIN — SODIUM CHLORIDE: 9 INJECTION, SOLUTION INTRAVENOUS at 10:06

## 2023-06-08 RX ADMIN — FENTANYL CITRATE 100 MCG: 50 INJECTION, SOLUTION INTRAMUSCULAR; INTRAVENOUS at 10:06

## 2023-06-08 RX ADMIN — METOCLOPRAMIDE 10 MG: 5 INJECTION, SOLUTION INTRAMUSCULAR; INTRAVENOUS at 08:06

## 2023-06-08 RX ADMIN — ACETAMINOPHEN 1000 MG: 500 TABLET ORAL at 08:06

## 2023-06-08 RX ADMIN — SODIUM CHLORIDE: 9 INJECTION, SOLUTION INTRAVENOUS at 11:06

## 2023-06-08 RX ADMIN — PROPOFOL 200 MG: 10 INJECTION, EMULSION INTRAVENOUS at 10:06

## 2023-06-08 RX ADMIN — ONDANSETRON 4 MG: 2 INJECTION INTRAMUSCULAR; INTRAVENOUS at 10:06

## 2023-06-08 RX ADMIN — GABAPENTIN 300 MG: 300 CAPSULE ORAL at 08:06

## 2023-06-08 RX ADMIN — ROCURONIUM BROMIDE 50 MG: 10 INJECTION INTRAVENOUS at 10:06

## 2023-06-08 RX ADMIN — MIDAZOLAM HYDROCHLORIDE 2 MG: 1 INJECTION, SOLUTION INTRAMUSCULAR; INTRAVENOUS at 10:06

## 2023-06-08 RX ADMIN — ESMOLOL HYDROCHLORIDE 20 MG: 100 INJECTION, SOLUTION INTRAVENOUS at 10:06

## 2023-06-08 RX ADMIN — SODIUM CITRATE AND CITRIC ACID MONOHYDRATE 30 ML: 500; 334 SOLUTION ORAL at 08:06

## 2023-06-08 NOTE — ANESTHESIA PROCEDURE NOTES
Intubation    Date/Time: 6/8/2023 10:38 AM  Performed by: Miguel Nam CRNA  Authorized by: Reyna Hitchcock MD     Intubation:     Induction:  Intravenous    Intubated:  Postinduction    Mask Ventilation:  Easy with oral airway    Attempts:  1    Attempted By:  CRNA    Method of Intubation:  Video laryngoscopy    Blade:  Amanda 3    Laryngeal View Grade: Grade IIA - cords partially seen      Difficult Airway Encountered?: No      Complications:  None    Airway Device:  Laser tube    Airway Device Size:  6.0    Style/Cuff Inflation:  Cuffed    Tube secured:  21    Secured at:  The lips    Placement Verified By:  Capnometry    Complicating Factors:  None    Findings Post-Intubation:  BS equal bilateral

## 2023-06-08 NOTE — BRIEF OP NOTE
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]    Procedure(s) (LRB):  Suspension microlaryngoscopy with KTP laser excision of lesion (N/A)    Anesthesia: General    Operative Findings: see full op note    Estimated Blood Loss: * No values recorded between 6/8/2023 10:40 AM and 6/8/2023 11:49 AM *         Specimens:   Specimen (24h ago, onward)       Start     Ordered    06/08/23 1108  Specimen to Pathology, Surgery ENT  Once        Comments: Pre-op Diagnosis: 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 CLAUDETTE RUIZ        06/08/23 1137                      Discharge Note    OUTCOME: Patient tolerated treatment/procedure well without complication and is now ready for discharge.    DISPOSITION: Home or Self Care    FINAL DIAGNOSIS:  leukoplakia    FOLLOWUP: In clinic    DISCHARGE INSTRUCTIONS:    Discharge Procedure Orders   Diet Adult Regular     No dressing needed     Activity as tolerated   Order Comments: Total voice rest for 3 days. Avoid speaking, singing, whispering. Try to limit coughing and throat clearing.

## 2023-06-08 NOTE — TRANSFER OF CARE
"Anesthesia Transfer of Care Note    Patient: Andrew Blum    Procedure(s) Performed: Procedure(s) (LRB):  Suspension microlaryngoscopy with KTP laser excision of lesion (N/A)    Patient location: PACU    Transport from OR: Transported from OR on 6-10 L/min O2 by face mask with adequate spontaneous ventilation    Post pain: adequate analgesia    Post assessment: no apparent anesthetic complications    Post vital signs: stable    Level of consciousness: sedated    Nausea/Vomiting: no nausea/vomiting    Complications: none    Transfer of care protocol was followed      Last vitals:   Visit Vitals  BP (!) 178/91 (BP Location: Right arm, Patient Position: Lying)   Pulse 76   Temp 37.2 °C (99 °F) (Temporal)   Resp 17   Ht 5' 10" (1.778 m)   SpO2 97%   BMI 25.11 kg/m²     "

## 2023-06-08 NOTE — DISCHARGE INSTRUCTIONS
VOICE REST FOLLOWING SURGERY     After having laryngeal (voice box) surgery, your voice needs complete rest in order to heal. At your follow-up appointment, which will be 4-6 days later, we will look at your larynx and see how it is healing. Then we will discuss a modified voice rest plan to gradually increase your voice use. This schedule is a basic guideline; specific time periods for complete and modified voice rest will be tailored to you.     OVERALL GUIDELINES FOR VOICE REST   Avoid coughing or throat clearing. If you feel the need to clear your throat, take a sip of water and swallow hard.   Avoid strenuous exercise or activity. Any noise or straining activity to your vocal cords during this time can be damaging and affect how your vocal folds heal.   Remain hydrated. Drink 8-10 glasses of water per day. Avoid caffeine, alcohol, and mentholated cough drops.   Breathe steam several times per day, either from your shower, sink, or a personal humidifier.   If you have reflux, follow diet precautions and take medicine as prescribed.   Avoid first- and second-hand smoke.     POST-OP VOICE SURGERY:   Week 1 following surgery  Complete voice rest  AFTER FOLLOW-UP APPOINTMENT:   Week 2 following surgery  Modified voice rest      Avoid talking, whispering, throat clearing, coughing, singing, humming, laughing, whistling, or playing musical instruments.   Use pen and paper to write message, or try an jessie on your smart phone/tablet. -- [employer]OV[employer]one apps: [employer], [employer] Text to Speech.  Android apps: Text to Speech toy.  Arrange for appropriate  support.   Change your outgoing voicemail message to redirect callers to email or text. Days 8-9: 5-10 minutes of voicing per hour, or voice use for business of life only   Days 10-11: 10-15 minutes of voicing per hour, or voice use for short sentences   Days 12-13: 15-20 minutes of voicing per hour, or voice use for short paragraphs   Days 14-15: 20-30 minutes  of voicing per hour   Days 16-17: Ramp up voice use to 80% of normal use. Be sure to take 10-minute voice naps each hour.        Keep in mind that this is a personalized progression. If you have any trouble, back up and do not progress until you are ready. Do not keep talking if your voice wears out or feels tired.

## 2023-06-08 NOTE — OP NOTE
DATE OF SERVICE:  06/08/2023    PRE-OPERATIVE DIAGNOSIS:  Bilateral vocal fold leukoplakia    POST-OPERATIVE DIAGNOSIS:   Bilateral vocal fold leukoplakia.    Anterior glottic micro web    PROCEDURE(S):  Suspension microlaryngoscopy with KTP laser photoablation of bilateral vocal fold lesions    SURGEON: Augusto Ruiz M.D.    ASSISTANT:  Ricardo Multani MD    ANESTHESIA: General.    BLOOD LOSS: Less than 5 mL.    SPECIMENS:  Right vocal fold lesion    COMPLICATIONS: None.    FINDINGS:   Sessile leukoplakia along the middle 3rd of bilateral true vocal folds, superior aspect, free edge, infraglottic aspect.  Elevation on subepithelial saline infusion.  Not concerning for malignancy or invasion.    CONDITION: Stable.    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  He presents today for micro laryngeal surgery. I discussed the risks, benefits and alternatives to surgery with the patient, as well as the expected postoperative course. Risks included but were not limited to pain; bleeding; infection; scarring; worsening of voice; recurrence; need for additional and/or more extensive procedures; oral/dental problems (pain, laceration, broken or missing teeth); jaw joint problems (pain, dislocation); and tongue problems (pain, laceration, numbness, weakness, taste disturbance). Any surgery on the larynx also carries with it the risks of airway obstruction necessitating tracheotomy. Also inherent in the procedure are the risks of general anesthesia, including but not limited to cardiovascular complications (heart attack, arrhythmia); pulmonary (respiratory failure); neurologic (stroke); and death. If the laser is used, it presents the risks of vision loss and fire. I gave the patient the opportunity to ask questions and I answered all of them. He wishes to proceed with surgery. Informed consent was obtained.    PROCEDURE IN DETAIL:    The patient was positively identified in the preoperative holding area, then was brought to the operating room and placed in the flat supine position. General endotracheal anesthesia was obtained using a 5 Zeietels endotracheal tube which was secured to the left lower lip. The eyes were protected with moist sponges. The teeth were protected using a reinforced mouthguard. A final timeout was performed for verification purposes. The 5 Zeitels laryngoscope was inserted into the oral cavity and was utilized to expose the larynx. The patient was suspended from the gallows. Magnified laryngeal endoscopy was carried out using a 0 degree Moise sary telescope connected to a video monitor. Findings were as noted above. Photodocumentation was obtained.  Next, attention was turned to performing surgical intervention as indicated. The operating microscope was brought into the field, and the remainder of the case was performed under maximal magnification.  A subepithelial saline infusion was performed in the right true vocal fold.  The disordered epithelium elevated, consistent with an intraepithelial process.  Attention was turned to micro flap biopsy of the lesion.  A laryngeal sickle blade was used to make a linear epithelial cordotomy at the junction of the right vocal fold lesion with the adjacent epithelium.  A medial micro flap was developed, with care maintained to preserve the integrity of the under larynx lamina propria.  An anterior and posterior epithelial cordotomy for made through the flap, which was then rotated medially and inferiorly setting up for an inferior epithelial cordotomy.  The specimen, once freed, was passed off for permanent analysis.  Next, attention was turned to KTP laser photo ablation. All operating room personnel were equipped with laser safe eyewear. The FiO2 was maintained at less than 30%. All exposed regions of the patient's skin and face were protected with moist towels.  The 0.4 mm KTP  laser fiber was loaded onto its straight handpiece.  It was set at 30 w, 15 millisecond pulses, 2 pps.  The laser was used in noncontact mode to perform photo ablation of all residual leukoplakia along the right true vocal fold.  Care was maintained to refrain from transferring any energy to the underlying lamina propria.  The treated tissue, once addressed with the laser cotton was carefully debrided with micro laryngeal suction.  The leukoplakia along the left true vocal fold was thereafter KTP laser photo ablated in a similar fashion.  With this, the procedure was brought to completion. The larynx was topically anesthetized with 3 mL of 4% lidocaine. All equipment was removed. The patient was turned back over to the anesthesiology team for awakening and extubation, which were uneventful. The patient was escorted to the recovery room in good condition. The patient tolerated the procedure well without complications. All needle, sponge, and instrument counts were correct at the completion of the case.    ATTESTATION:  As the attending of record, I was present and participated in all portions of this procedure.

## 2023-06-09 ENCOUNTER — TELEPHONE (OUTPATIENT)
Dept: OTOLARYNGOLOGY | Facility: CLINIC | Age: 68
End: 2023-06-09
Payer: MEDICARE

## 2023-06-09 ENCOUNTER — PATIENT MESSAGE (OUTPATIENT)
Dept: OTOLARYNGOLOGY | Facility: CLINIC | Age: 68
End: 2023-06-09
Payer: MEDICARE

## 2023-06-09 NOTE — TELEPHONE ENCOUNTER
----- Message from Di Murphy sent at 6/9/2023  1:15 PM CDT -----  Regarding: Missed Call  Contact: [phone]  Pts spouse is requesting a call back stating there was a missed call from you.  She is requesting to be called at [phone] because pt can't speak.  If you call pts phone, he can't answer, only text.  Please call.

## 2023-06-09 NOTE — TELEPHONE ENCOUNTER
----- Message from Terra Roa sent at 6/9/2023  8:22 AM CDT -----  Regarding: pt advice  Contact: [phone]  Pt wife Divya reina in regards to needing to speak to provider. Pls call

## 2023-06-12 NOTE — ANESTHESIA POSTPROCEDURE EVALUATION
Anesthesia Post Evaluation    Patient: Andrew Blum    Procedure(s) Performed: Procedure(s) (LRB):  Suspension microlaryngoscopy with KTP laser excision of lesion (N/A)    Final Anesthesia Type: general      Patient location during evaluation: PACU  Patient participation: Yes- Able to Participate  Level of consciousness: awake and alert  Post-procedure vital signs: reviewed and stable  Pain management: adequate  Airway patency: patent    PONV status at discharge: No PONV  Anesthetic complications: no      Cardiovascular status: blood pressure returned to baseline  Respiratory status: unassisted, spontaneous ventilation and room air  Hydration status: euvolemic  Follow-up not needed.          Vitals Value Taken Time   /81 06/08/23 1317   Temp 36.7 °C (98 °F) 06/08/23 1152   Pulse 92 06/08/23 1324   Resp 22 06/08/23 1324   SpO2 90 % 06/08/23 1324   Vitals shown include unvalidated device data.      Event Time   Out of Recovery 12:30:00         Pain/Mario Score: No data recorded

## 2023-06-13 LAB
FINAL PATHOLOGIC DIAGNOSIS: NORMAL
GROSS: NORMAL
Lab: NORMAL
MICROSCOPIC EXAM: NORMAL

## 2023-07-26 ENCOUNTER — OFFICE VISIT (OUTPATIENT)
Dept: OTOLARYNGOLOGY | Facility: CLINIC | Age: 68
End: 2023-07-26
Payer: MEDICARE

## 2023-07-26 VITALS — HEART RATE: 76 BPM | DIASTOLIC BLOOD PRESSURE: 76 MMHG | SYSTOLIC BLOOD PRESSURE: 139 MMHG

## 2023-07-26 DIAGNOSIS — R49.0 DYSPHONIA: Primary | ICD-10-CM

## 2023-07-26 DIAGNOSIS — J38.7 LEUKOPLAKIA OF LARYNX: ICD-10-CM

## 2023-07-26 DIAGNOSIS — J04.0 LARYNGITIS: ICD-10-CM

## 2023-07-26 PROCEDURE — 31579 LARYNGOSCOPY TELESCOPIC: CPT | Mod: S$PBB,,, | Performed by: OTOLARYNGOLOGY

## 2023-07-26 PROCEDURE — 99213 OFFICE O/P EST LOW 20 MIN: CPT | Mod: PBBFAC | Performed by: OTOLARYNGOLOGY

## 2023-07-26 PROCEDURE — 99999 PR PBB SHADOW E&M-EST. PATIENT-LVL III: ICD-10-PCS | Mod: PBBFAC,,, | Performed by: OTOLARYNGOLOGY

## 2023-07-26 PROCEDURE — 31579 PR LARYNGOSCOPY, FLEX/RIGID TELESCOPIC, W/STROBOSCOPY: ICD-10-PCS | Mod: S$PBB,,, | Performed by: OTOLARYNGOLOGY

## 2023-07-26 PROCEDURE — 99214 OFFICE O/P EST MOD 30 MIN: CPT | Mod: 25,S$PBB,, | Performed by: OTOLARYNGOLOGY

## 2023-07-26 PROCEDURE — 99999 PR PBB SHADOW E&M-EST. PATIENT-LVL III: CPT | Mod: PBBFAC,,, | Performed by: OTOLARYNGOLOGY

## 2023-07-26 PROCEDURE — 99214 PR OFFICE/OUTPT VISIT, EST, LEVL IV, 30-39 MIN: ICD-10-PCS | Mod: 25,S$PBB,, | Performed by: OTOLARYNGOLOGY

## 2023-07-26 PROCEDURE — 31579 LARYNGOSCOPY TELESCOPIC: CPT | Mod: PBBFAC | Performed by: OTOLARYNGOLOGY

## 2023-07-26 NOTE — PATIENT INSTRUCTIONS
OFFICE-BASED LARYNGEAL LASER TREATMENT    Description  In the past, most growths in the larynx were treated in the operating room under general anesthesia. Our technology and expertise now allows us to treat some lesions in the office, without the need for anesthesia. We use a flexible scope passed through the nose and pass a flexible laser fiber through the side channel of the scope.    Instructions: before the procedure  Do not take aspirin-containing products or other medications that can thin the blood (such as ibuprofen, Advil, Motrin, Aleve, Plavix) for 7 days prior to the procedure. If you take Coumadin (warfarin), you may need to stop using this a few days prior to the injection. If you are on blood thinning (anti-platelet or anti-coagulant) medication and it is not clear what you should do, please clarify this with your physician.   On the day of your procedure, please make sure you take your other regular morning medications.  On the day of your procedure, it is OK to eat and drink as you would normally, up until 3 hours before to your appointment time. During that time frame, we ask that you restrict yourself only to clear liquids. A clear liquid is anything you can see through (water, ginger ale, apple juice).    What to expect during the procedure  We perform the procedure in our office under local (topical) anesthesia. You are awake the whole time, and the entire procedure lasts about 15 minutes. Our primary focus is your safety and comfort. We usually make the larynx numb by spraying the throat and/or dripping anesthetic on the larynx. At this time, you may cough or gag, or you may have the sensation that you spilled some water down the wrong pipe. These are temporary sensations that allow us to get you numb. The numbing process takes about 2 minutes. We will not proceed until we know you will be as comfortable as possible.     You will wear orange-tinted glasses as a safety measure to protect your  eyes from the laser light. The laser fiber is then passed through the side port of the laryngoscope. The tissue is then  treated with the laser. You may feel some very mild discomfort while this is happening. You may also experience an unusual smell which is due to the laser treatment effects. While we are working, we ask that you relax and focus on breathing through your nose. We ask that you refrain from speaking or swallowing. We will give you breaks ourselves. However, if you feel the need to take a break, then raise your hand and we will accommodate you.      In rare cases in which a patient does not tolerate the procedure, it may be performed in the operating room, with the patient completely asleep under general anesthesia.    What to expect afterwards  Both the immediate and long-term voice outcome depends on the indications for the procedure and the extent of our work. Sometimes the voice may sound rough or strained. A temporary worsening of the voice may become more prominent for the first few days due to swelling before gradually improving to a new baseline voice. Voice therapy may be a necessary part of your treatment plan to optimize your vocal outcome. Sometimes it is necessary to undergo a series of treatments in a staged fashion. Sometimes it is necessary to transition to treatment in the operating room.    Instructions: after the procedure  Please refrain from eating or drinking for 1 hour following the procedure. This allows time for the numbing medication to wear off.  Most patients experience very little pain. If necessary, most patients are able to keep comfortable with plain Tylenol (acetaminophen) and/or other non-steroidal anti-inflammatory medications such as ibuprofen (Advil, Motrin). Please follow package instructions if considering taking these medications.  In most instances, it is OK to use your voice immediately after the procedure. However, for the first week, you should avoid  speaking over heavy background noise or in a very loud voice. It is rare, but in some cases you will be asked to rest your voice for the first 24 hours.  Please call the [facility] at [phone] if  You have a temperature above 101°F  You develop Increasing throat pain not relieved by over-the-counter medications  You have any other post-operative questions or concerns  Please go immediately to the nearest emergency room if you are experiencing  Shortness of breath  Difficulty breathing  Difficulty swallowing  Severe bleeding

## 2023-07-26 NOTE — PROGRESS NOTES
[facility]  Department of Otorhinolaryngology and Communication Sciences    Subjective:      Andrew Blum is a 67 y.o. male who presents for follow-up regarding leukoplakia/laryngitis.    Cultures 2/2/2023  Fungus - negative  Aerobic/anaerobic cultures -   SERRATIA LIQUEFACIENS   Moderate   No other significant isolate   Abnormal   PREVOTELLA (B.) MELANINOGENICA   Few     6/8/2023: Suspension microlaryngoscopy with KTP laser photoablation of bilateral vocal fold lesions  BIOPSY OF RIGHT VOCAL FOLD:   NO CARCINOMA OR HIGH-GRADE DYSPLASIA IDENTIFIED   HYPERGRANULOSIS WITH HYPER AND PARAKERATOSIS   AT THE MOST MINIMAL DYSPLASIA     Had a viral illness/cough since his surgery. As a consequence of which his voice is still a little hoarse.    The patient's medications, allergies, past medical, surgical, social and family histories were reviewed and updated as appropriate.    A detailed review of systems was obtained with pertinent positives as per the above HPI, and otherwise negative.      Objective:     VS reviewed   Constitutional: comfortable, well dressed  Psychiatric: appropriate affect  Respiratory: comfortably breathing, symmetric chest rise, no stridor  Voice:  faint variable roughness with inconsistent pitch breaks  Head: normocephalic  Eyes: conjunctivae and sclerae clear  Indirect laryngoscopy is limited due to gag    Procedure  Rigid Laryngeal Videostroboscopy (42294): Laryngeal videostroboscopy is indicated to assess the laryngeal vibratory biomechanics and vocal fold oscillation, which cannot be assessed with a plain light examination. This was carried out with a 70 degree endoscope. After verbal consent was obtained, the patient was positioned and the tongue was gently secured with a gauze sponge. The endoscope was passed transorally and positioned to image the larynx and hypopharynx in detail. The following features were examined: laryngeal and hypopharyngeal masses; vocal fold range and  symmetry of motion; laryngeal mucosal edema, erythema, inflammation, and hydration; salivary pooling; and gross laryngeal sensation. During phonation, the vocal folds were assessed for glottal closure; mucosal wave; vocal fold lesions; vibratory periodicity, amplitude, and phase symmetry; and vertical height match. The equipment was removed. The patient tolerated the procedure well without complication. 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 male with a history of polymicrobial chronic laryngitis with leukoplakia/keratosis, with a newly developed left vocal process granuloma following a recent cough.     Plan:     I recommended that he remain on acid suppression.  I counseled him away from loud or prolonged voice use.    Together, we decided on proceeding with office based pulsed laser treatment of the residual keratosis and possibly the granuloma.  I discussed with him the risks and benefits thereof.  I gave him the opportunity to ask questions, and I answered all them to his satisfaction.  He wishes to proceed.  We will arrange this in the coming weeks.    Augusto Ruiz M.D.  [facility]  Department of Otorhinolaryngology and Communication Sciences

## 2023-09-19 ENCOUNTER — PROCEDURE VISIT (OUTPATIENT)
Dept: OTOLARYNGOLOGY | Facility: CLINIC | Age: 68
End: 2023-09-19
Payer: MEDICARE

## 2023-09-19 DIAGNOSIS — J38.7 LEUKOPLAKIA OF LARYNX: ICD-10-CM

## 2023-09-19 DIAGNOSIS — D38.0 NEOPLASM OF UNCERTAIN BEHAVIOR OF LARYNX: ICD-10-CM

## 2023-09-19 DIAGNOSIS — J04.0 LARYNGITIS: ICD-10-CM

## 2023-09-19 DIAGNOSIS — R49.0 DYSPHONIA: Primary | ICD-10-CM

## 2023-09-19 PROCEDURE — 31572 PR LARYNGOSCOPY, FLEXIBLE; W/ LASER DSTRJ LES, UNI: ICD-10-PCS | Mod: S$PBB,RT,, | Performed by: OTOLARYNGOLOGY

## 2023-09-19 PROCEDURE — 31572 LARGSC W/LASER DSTRJ LES: CPT | Mod: S$PBB,RT,, | Performed by: OTOLARYNGOLOGY

## 2023-09-19 PROCEDURE — 31572 LARGSC W/LASER DSTRJ LES: CPT | Mod: PBBFAC | Performed by: OTOLARYNGOLOGY

## 2023-09-21 NOTE — PROCEDURES
Procedures  [facility]  Department of Otorhinolaryngology and Communication Sciences    Awake Laryngeal Procedure Operative Report    Preoperative Diagnosis:  Glottic keratosis .    Postoperative Diagnosis:  Glottic keratosis.    Procedure: Transnasal magnified laryngoscopy with pulsed 532 nm KTP laser photoablation.    Surgeon: Augusto Ruiz M.D.     Estimated blood loss: None.    Anesthesia: Local and topical.    Complications: None.    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  He presents for awake office-based KTP laser treatment of the laryngeal abnormality. Risks of rae proecedure were reviewed with the patient, including but not limited to bleeding, infection, allergic reaction to the medication, scarring, worsening of voice, need for additional procedures, pain, epistaxis, laryngospasm, laser injury (vision loss, thermal injury), and airway obstruction which could necessitate need for intubation or tracheostomy. All questions were answered and the patient agreed to move forward with the procedure. Informed consent was obtained.    Procedure in detail: Andrew Blum was positively identified with two identifiers and was brought into the procedure suite. He was seated upright in a comfortable position. Final time-out was performed for verification purposes. The nasal cavity was topically decongested with 1% phenylephrine and topically anesthetized with 4% lidocaine. The distal chip digital videolaryngoscope was advanced through the patients most patent nasal cavity. The larynx was inspected under maximal magnification,  with findings as noted above.    Attention was turned toward anesthetizing the endolarynx, which was achieved with a total volume of 3 mL of  4% lidocaine administered in consecutive aliquots through the side port of the laryngoscope using the laryngeal gargle technique.    After an adequate degree of anesthesia was obtained, attention was turned to KTP laser treatment. The laryngoscope was temporarily removed. A 0.4 mm KTP laser fiber was advanced through the working channel of the laryngoscope. The fiber was connected to the unit. The patient and all personnel were equipped with the appropriate protective eyeware. The laryngoscope was reinserted through the nasal cavity and advanced into the endolarynx. The laser was activated with settings of  30 Beatty, 15 msec pulses, 2 pulses per second. Under the guidance of magnified laryngoscopy, the pulsed KTP laser was used to treat the laryngeal abnormality along the right true vocal fold. Appropriate treatment response was noted.  Initial noncontact mode was utilized.  As treatment progressed, contact mode was then utilized.  The treated keratosis blanched and desiccated.  Once it was disrupted, it was carefully debrided with the tip of the inactive laser fiber and the inline suction. The equipment was removed. The patient tolerated the procedure well without complication. All instrument counts were correct at the completion of the case.    Attestation: As the attending of record, I was present and participated in all portions of this procedure.    Disposition: The patient was observed briefly before being discharged home in good condition. He was instructed to call the office or return to the emergency department should he develop a fever greater than 101 degrees F, increasing pain not relieved by over-the-counter medication, shortness of breath or noisy breathing, difficulty swallowing, swelling, bleeding, or any other concerns. Post-procedure instructions were  provided and were reviewed with the patient, who acknowledged understanding. He will follow up with me in about 4-6 weeks.  Depending on his progress, additional treatment might be necessary.  All questions were answered, and the patient is in agreement with the above.    Augusto Ruiz M.D.  [facility]  Department of Otorhinolaryngology and Communication Sciences

## 2025-01-11 NOTE — PRE-PROCEDURE INSTRUCTIONS
Following instructions given via phone:    On the day of surgery please report to [facility] located at [address] [place] [address].  Please park in the lot in front of the building and enter at the main entrance. Proceed to the second floor for registration.    Arrival time: 0800    You may not drive home after your procedure. You may not leave alone in an uber, taxi, etc.  You must be discharged to a responsible adult.  If possible, please have your visitor &/or ride home stay during your visit.   The surgeon should speak with your visitors after your procedure.  All visitors must be 18 years of age or older. Please limit your visitors to max of 2 people.  Have visitors bring snacks &/or lunch as the facility only has drink vending machines.    No food, no drink after midnight.  Take the following medications the morning of your procedure: see med list, take with water    If applicable, do not shave the surgical site 5 days prior to your procedure.  Shower the night before and the morning of your procedure with antibacterial soap.  Do not apply any products to your skin nor your hair after you shower the morning of your procedure.  Wear loose, comfortable clothing.  No jewelry. No contacts. Bring glasses if necessary.  If you have dentures, bring a case.  Leave all valuables at home.               whitney

## (undated) DEVICE — 400U BARE LASER FIBER

## (undated) DEVICE — KIT ANTIFOG W/SPONG & FLUID

## (undated) DEVICE — TRAY ENT 4/CS